# Patient Record
Sex: FEMALE | Race: WHITE | Employment: FULL TIME | URBAN - METROPOLITAN AREA
[De-identification: names, ages, dates, MRNs, and addresses within clinical notes are randomized per-mention and may not be internally consistent; named-entity substitution may affect disease eponyms.]

---

## 2018-01-16 ENCOUNTER — OFFICE VISIT (OUTPATIENT)
Dept: DERMATOLOGY | Age: 63
End: 2018-01-16

## 2018-01-16 DIAGNOSIS — L57.0 AK (ACTINIC KERATOSIS): ICD-10-CM

## 2018-01-16 DIAGNOSIS — L82.0 INFLAMED SEBORRHEIC KERATOSIS: ICD-10-CM

## 2018-01-16 DIAGNOSIS — D48.5 NEOPLASM OF UNCERTAIN BEHAVIOR OF SKIN: ICD-10-CM

## 2018-01-16 DIAGNOSIS — Z85.828 HISTORY OF NONMELANOMA SKIN CANCER: Primary | ICD-10-CM

## 2018-01-16 DIAGNOSIS — L71.9 ROSACEA: ICD-10-CM

## 2018-01-16 PROCEDURE — 11100 PR BIOPSY OF SKIN LESION: CPT | Performed by: DERMATOLOGY

## 2018-01-16 PROCEDURE — 17110 DESTRUCTION B9 LES UP TO 14: CPT | Performed by: DERMATOLOGY

## 2018-01-16 PROCEDURE — 11101 PR BIOPSY, EACH ADDED LESION: CPT | Performed by: DERMATOLOGY

## 2018-01-16 PROCEDURE — 99203 OFFICE O/P NEW LOW 30 MIN: CPT | Performed by: DERMATOLOGY

## 2018-01-16 RX ORDER — FLUOROURACIL 50 MG/G
CREAM TOPICAL
Qty: 40 G | Refills: 0 | Status: SHIPPED | OUTPATIENT
Start: 2018-01-16 | End: 2021-12-09 | Stop reason: SDUPTHER

## 2018-01-16 NOTE — PATIENT INSTRUCTIONS
thin film of white petrolatum (Vaseline©).  You do not need to cover the area, but can if you prefer.  Do NOT allow the site to become dry or crusted, or attempt to dry it out with rubbing alcohol or hydrogen peroxide.  Continue this regimen until the area is pink and healed. Depending on the size and location of your cryosurgery site, healing may take 2 to 4 weeks.  The area may continue to be pink for several weeks, and over the next few months may become darker or lighter than the surrounding skin. This may be a permanent change. Fluorouracil (Efudex / Carac)     Your physician has prescribed a topical medication that is used to treat pre-cancerous skin lesions and certain types of skin cancers. We are providing you with the following information so that you understand how the medication should be used and potential side effects you may experience.  Clean and dry the areas of the skin that will be treated.  Apply a small amount of the medication to your fingertip. Dab the medication onto the designated areas and rub it in.  Discontinue the medication once the skin starts to scab. Typically this takes between 2 and 4 weeks after starting the medication.  Avoid applying the medication in or around the eyes or eyelids. If the medication gets into your eyes, nose or mouth, rinse immediately.  Wash your hands immediately after application.  Side effects include: burning, redness, irritation, dryness, pain, swelling and changes in skin color. Vaseline and/or cool compresses may be applied to affected areas for comfort.  Please note that you may be more sensitive to the sun. Use sunscreen and wear protective clothing when outdoors. Avoid prolonged sun exposure.     · Once you have discontinued the medication, apply vaseline several times daily until the skin has

## 2018-01-23 ENCOUNTER — TELEPHONE (OUTPATIENT)
Dept: DERMATOLOGY | Age: 63
End: 2018-01-23

## 2018-07-31 ENCOUNTER — OFFICE VISIT (OUTPATIENT)
Dept: DERMATOLOGY | Age: 63
End: 2018-07-31

## 2018-07-31 DIAGNOSIS — L57.0 AK (ACTINIC KERATOSIS): Primary | ICD-10-CM

## 2018-07-31 DIAGNOSIS — L82.0 INFLAMED SEBORRHEIC KERATOSIS: ICD-10-CM

## 2018-07-31 PROCEDURE — 17003 DESTRUCT PREMALG LES 2-14: CPT | Performed by: DERMATOLOGY

## 2018-07-31 PROCEDURE — 17110 DESTRUCTION B9 LES UP TO 14: CPT | Performed by: DERMATOLOGY

## 2018-07-31 PROCEDURE — 17000 DESTRUCT PREMALG LESION: CPT | Performed by: DERMATOLOGY

## 2018-07-31 NOTE — PROGRESS NOTES
[facility]  Nino Tran MD  [phone]      Brain Rodriguez Fitzpatric  1955    63 y.o. female     Date of Visit: 7/31/2018    Last seen: 1-2018    Chief Complaint: f/u AK's  Chief Complaint   Patient presents with    Follow-up     6 month check - used efudex on chest     History of Present Illness:  *son is getting  in June 2019    Here for f/u for AK's. S/p efudex to the chest x 3+ weeks in Jan 2018 - had significant inflammation and healed well - feels much smoother and has only a few rough lesions remaining. S/p bx of lesio non the L medial breast at last visit - read as AK, cleared with efudex. She has an irritated lesion on the R temple and L arm. [facility] on the back ~ 2000. BCC on the R eyelid/periocular area s/p Mohs prior to 2000 - she believes had Mohs by Dr. Jackie Jones. 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 No family hx of skin cancer. Wears sunscreen. Review of Systems:  Gen: Feels well, good sense of health. Skin: No changing moles or lesions. No new rashes. Past Medical History, Family History, Surgical History, Medications and Allergies reviewed. Outpatient Prescriptions Marked as Taking for the 7/31/18 encounter (Office Visit) with Millie Jung MD   Medication Sig Dispense Refill    fluorouracil (EFUDEX) 5 % cream Apply topically 2 times daily to the chest for ~ 2 weeks or until significant irritation. Avoid the eyes and wash hands. 40 g 0     No Known Allergies    History reviewed. No pertinent past medical history. Past Surgical History:   Procedure Laterality Date    COLONOSCOPY  12/09/2016    Normal repeat in 2026    HYSTERECTOMY      MOHS SURGERY      bcc on rt eyelid and back       Physical Examination     Gen, well-appearing    Scars clear  L medial breast clear  Chest with 4 erythematous roughly scaled macules remaining              Assessment and Plan     1.  Numerous AK's - chest - marked improvement and 4 remaining  - 4 lesion(s) on the chest treated with liquid nitrogen x 2 cycles. Patient educated on risk of blister, hypopigmentation/scar and wound care. 2. ISK - R temple, L arm - 2  - lesion(s) on the above areas treated with liquid nitrogen x 2 cycles. Patient educated on risk of blister, hypopigmentation/scar and wound care.       Previously discussed - Rosacea - ET - moderate  - ed Vbeam - 200-250/trx x 3 trx

## 2019-01-29 ENCOUNTER — OFFICE VISIT (OUTPATIENT)
Dept: DERMATOLOGY | Age: 64
End: 2019-01-29
Payer: COMMERCIAL

## 2019-01-29 DIAGNOSIS — Z85.828 HISTORY OF NONMELANOMA SKIN CANCER: Primary | ICD-10-CM

## 2019-01-29 DIAGNOSIS — L82.1 SEBORRHEIC KERATOSIS: ICD-10-CM

## 2019-01-29 DIAGNOSIS — D22.9 MULTIPLE NEVI: ICD-10-CM

## 2019-01-29 DIAGNOSIS — L71.9 ROSACEA: ICD-10-CM

## 2019-01-29 DIAGNOSIS — L57.0 AK (ACTINIC KERATOSIS): ICD-10-CM

## 2019-01-29 PROCEDURE — 99213 OFFICE O/P EST LOW 20 MIN: CPT | Performed by: DERMATOLOGY

## 2019-01-29 PROCEDURE — 17003 DESTRUCT PREMALG LES 2-14: CPT | Performed by: DERMATOLOGY

## 2019-01-29 PROCEDURE — MISCDES14 COSMETIC DESTRUCTION (CRYO/ED) OF BENIGN LESION - UP TO 14: Performed by: DERMATOLOGY

## 2019-01-29 PROCEDURE — 17000 DESTRUCT PREMALG LESION: CPT | Performed by: DERMATOLOGY

## 2019-10-24 ENCOUNTER — OFFICE VISIT (OUTPATIENT)
Dept: DERMATOLOGY | Age: 64
End: 2019-10-24
Payer: COMMERCIAL

## 2019-10-24 DIAGNOSIS — L57.0 AK (ACTINIC KERATOSIS): ICD-10-CM

## 2019-10-24 DIAGNOSIS — D48.5 NEOPLASM OF UNCERTAIN BEHAVIOR OF SKIN: ICD-10-CM

## 2019-10-24 DIAGNOSIS — L82.1 SEBORRHEIC KERATOSIS: ICD-10-CM

## 2019-10-24 DIAGNOSIS — Z85.828 HISTORY OF NONMELANOMA SKIN CANCER: Primary | ICD-10-CM

## 2019-10-24 DIAGNOSIS — L71.9 ROSACEA: ICD-10-CM

## 2019-10-24 DIAGNOSIS — D22.9 MULTIPLE NEVI: ICD-10-CM

## 2019-10-24 PROCEDURE — 99213 OFFICE O/P EST LOW 20 MIN: CPT | Performed by: DERMATOLOGY

## 2019-10-24 PROCEDURE — MISCTAG14 COSMETIC REMOVAL OF BENIGN TAGS UP TO 14: Performed by: DERMATOLOGY

## 2019-10-24 PROCEDURE — 17003 DESTRUCT PREMALG LES 2-14: CPT | Performed by: DERMATOLOGY

## 2019-10-24 PROCEDURE — 11102 TANGNTL BX SKIN SINGLE LES: CPT | Performed by: DERMATOLOGY

## 2019-10-24 PROCEDURE — 17000 DESTRUCT PREMALG LESION: CPT | Performed by: DERMATOLOGY

## 2019-10-28 LAB — DERMATOLOGY PATHOLOGY REPORT: NORMAL

## 2020-04-13 ENCOUNTER — TELEPHONE (OUTPATIENT)
Dept: INTERNAL MEDICINE CLINIC | Age: 65
End: 2020-04-13

## 2020-04-13 RX ORDER — METHYLPREDNISOLONE 4 MG/1
TABLET ORAL
Qty: 21 TABLET | Refills: 0 | Status: SHIPPED | OUTPATIENT
Start: 2020-04-13 | End: 2020-04-19

## 2021-05-27 ENCOUNTER — OFFICE VISIT (OUTPATIENT)
Dept: DERMATOLOGY | Age: 66
End: 2021-05-27
Payer: COMMERCIAL

## 2021-05-27 VITALS — TEMPERATURE: 97.8 F

## 2021-05-27 DIAGNOSIS — L71.9 ROSACEA: ICD-10-CM

## 2021-05-27 DIAGNOSIS — D22.9 MULTIPLE NEVI: ICD-10-CM

## 2021-05-27 DIAGNOSIS — D48.5 NEOPLASM OF UNCERTAIN BEHAVIOR OF SKIN: ICD-10-CM

## 2021-05-27 DIAGNOSIS — L82.1 SEBORRHEIC KERATOSIS: ICD-10-CM

## 2021-05-27 DIAGNOSIS — Z85.828 HISTORY OF NONMELANOMA SKIN CANCER: Primary | ICD-10-CM

## 2021-05-27 DIAGNOSIS — L57.0 AK (ACTINIC KERATOSIS): ICD-10-CM

## 2021-05-27 DIAGNOSIS — L82.0 INFLAMED SEBORRHEIC KERATOSIS: ICD-10-CM

## 2021-05-27 PROCEDURE — 99213 OFFICE O/P EST LOW 20 MIN: CPT | Performed by: DERMATOLOGY

## 2021-05-27 PROCEDURE — 17110 DESTRUCTION B9 LES UP TO 14: CPT | Performed by: DERMATOLOGY

## 2021-05-27 PROCEDURE — 11102 TANGNTL BX SKIN SINGLE LES: CPT | Performed by: DERMATOLOGY

## 2021-05-27 PROCEDURE — 17000 DESTRUCT PREMALG LESION: CPT | Performed by: DERMATOLOGY

## 2021-05-27 PROCEDURE — 17003 DESTRUCT PREMALG LES 2-14: CPT | Performed by: DERMATOLOGY

## 2021-05-27 NOTE — PATIENT INSTRUCTIONS
Cryosurgery (Freezing) Wound Care Instructions    AFTER THE PROCEDURE:    You will notice swelling and redness around the site. This is normal.    You may experience a sharp or sore feeling for the next several days. For this discomfort, you may take acetaminophen (Tylenol©).  A blister may develop at the treated area, sometimes as soon as by the end of the day. After several days, the blister will subside and a scab will form.  If the area is bumped or traumatized during the first few days following freezing, you may develop bleeding into the blister, forming a blood blister. This is nothing to be alarmed about.  If the blister is tense, uncomfortable, or much larger than the site that was frozen, you may pop the blister along its edge with a sterile needle (boiled, heated under a flame, or cleaned with alcohol) to allow the fluid to drain out. If the blister does not bother you, no treatment is needed.  Do NOT peel off the top of the blister roof. It will act as a dressing on top of your wound. WOUND CARE:    You may shower or bathe as usual, but avoid scrubbing the areas that have been frozen.  Cleanse the site twice a day with mild soapy water, and then apply a thin film of white petrolatum (Vaseline©).  You do not need to cover the area, but can if you prefer.  Do NOT allow the site to become dry or crusted, or attempt to dry it out with rubbing alcohol or hydrogen peroxide.  Continue this regimen until the area is pink and healed. Depending on the size and location of your cryosurgery site, healing may take 2 to 4 weeks.  The area may continue to be pink for several weeks, and over the next few months may become darker or lighter than the surrounding skin. This may be a permanent change. Biopsy Wound Care Instructions    · Keep the bandage in place for 24 hours. · Cleanse the wound with mild soapy water daily   Gently dry the area.    Apply Vaseline or petroleum jelly

## 2021-06-01 LAB — DERMATOLOGY PATHOLOGY REPORT: ABNORMAL

## 2021-06-03 DIAGNOSIS — C44.319 BASAL CELL CARCINOMA (BCC) OF SKIN OF OTHER PART OF FACE: Primary | ICD-10-CM

## 2021-08-26 ENCOUNTER — PROCEDURE VISIT (OUTPATIENT)
Dept: SURGERY | Age: 66
End: 2021-08-26
Payer: COMMERCIAL

## 2021-08-26 VITALS — TEMPERATURE: 97.4 F | SYSTOLIC BLOOD PRESSURE: 141 MMHG | DIASTOLIC BLOOD PRESSURE: 85 MMHG

## 2021-08-26 DIAGNOSIS — C44.41 BASAL CELL CARCINOMA (BCC) OF SCALP: Primary | ICD-10-CM

## 2021-08-26 PROCEDURE — 17311 MOHS 1 STAGE H/N/HF/G: CPT | Performed by: DERMATOLOGY

## 2021-08-26 PROCEDURE — 14040 TIS TRNFR F/C/C/M/N/A/G/H/F: CPT | Performed by: DERMATOLOGY

## 2021-08-26 PROCEDURE — 17312 MOHS ADDL STAGE: CPT | Performed by: DERMATOLOGY

## 2021-08-26 PROCEDURE — 99214 OFFICE O/P EST MOD 30 MIN: CPT | Performed by: DERMATOLOGY

## 2021-08-26 RX ORDER — CEPHALEXIN 500 MG/1
500 CAPSULE ORAL 3 TIMES DAILY
Qty: 15 CAPSULE | Refills: 0 | Status: SHIPPED | OUTPATIENT
Start: 2021-08-26 | End: 2022-06-30

## 2021-08-26 NOTE — PROGRESS NOTES
MOHS PROCEDURE NOTE    PHYSICIAN:  Jean Downs. Jose A Wade MD    ASSISTANT: Lucio Patel RN     REFERRING PROVIDER:  Ruthann Shine MD    PREOPERATIVE DIAGNOSIS:Superficial and Nodular Basal Cell Carcinoma     SPECIFIC MOHS INDICATIONS:  location    AUC SCORIN/9    POSTOPERATIVE DIAGNOSIS: SAME    LOCATION: Left frontal hairline    OPERATIVE PROCEDURE:  MOHS MICROGRAPHIC SURGERY    RECONSTRUCTION OF DEFECT: Stellate advancement flap    PREOPERATIVE SIZE: 10x6 MM    DEFECT SIZE: 19x17 MM    LENGTH OF REPAIRED WOUND/SIZE OF FLAP/SIZE OF GRAFT:  35x15 MM + size of defect = 8.48cm2    ANESTHESIA:  7mL 1% lidocaine with epinephrine 1:100,000 buffered. EBL:  MINIMAL    DURATION OF PROCEDURE:  3 HOURS    POSTOPERATIVE OBSERVATION: 1 HOUR    SPECIMENS:  SEE MOHS MAP    COMPLICATIONS:  NONE    DESCRIPTION OF PROCEDURE:  The patient was given a mirror, as appropriate, and the biopsy site was identified, marked with a surgical marking pen, and verified by the patient. Options for treatment were discussed and the patient was informed that Mohs surgery was the selected treatment based on its lower recurrence rate, given the features listed above, as compared to other treatment modalities such as excision, radiation, or curettage, and agreed with this treatment plan. Risks and benefits including bruising, swelling, bleeding, infection, nerve injury, recurrence, and scarring were discussed with the patient prior to the procedure and a written consent detailing these and other risks was reviewed with the patient and signed. There was a time out for person and procedure verification. The surgical site was prepped with an antiseptic solution. Application of an antiseptic solution was repeated before each surgical stage. Stage I:  The clinically-apparent tumor was carefully defined and debulked, determining the edge of the surgical excision.     A thin layer of tumor-laden tissue was excised with a narrow margin of normal-appearing skin, using the technique of Mohs. A map was prepared to correspond to the area of skin from which it was excised. Hemostasis was achieved using electrosurgery. The wound was bandaged. The tissue was prepared for the cryostat and sectioned. 1 section(s) prepared. Each section was coded, cut, and stained for microscopic examination. The entire base and margins of the excised piece of tissue were examined by the surgeon. The tissue was examined to the level of subcutaneous fat. Stage I:  Superficial BCC: isolated basaloid lobules projecting from the lower margin of the epidermis and Nodular BCC: large basaloid lobules of varying shape and size with peripheral palisading present around the rim of the lobule, with retraction of the tumor lobules from their associated stroma. Stage II:  Superficial BCC: isolated basaloid lobules projecting from the lower margin of the epidermis. The remaining tumor was noted and the next stage was performed. Stage II, III:  A thin layer of tissue was removed at the histologically-identified sites of remaining tumor. The entire procedure as described in stage I was repeated to process the tissue according to Mohs technique. 1 section(s) prepared for stage II and III. No tumor was identified at the peripheral margins of stage III of microscopically controlled surgery. DEFECT MANAGEMENT:    REPAIR DESCRIPTION:  Various closure modalities were discussed with the patient, and it was decided that a stellate advancement flap would best preserve normal anatomic and functional relationships. Additional risks of flap necrosis, irregular scar line and wound dehiscence were discussed. The patient was placed on the procedure room table. The area was anesthetized with 1% lidocaine with epinephrine 1:100,000 buffered, was given a sterile prep using Chlorhexidine gluconate 4% solution  and draped in the usual sterile fashion.  Incisions were made in the appropriate fashion for the flap designed. The wound was extensively undermined and meticulous hemostasis were obtained using spot monopolar electrocoagulation. The flap was elevated and advanced into the primary defect. Subcutaneous dead space and dermis were closed using  4-0  Vicryl and the epidermis was approximated using 4-0/5-0 Prolene interrupted epidermal sutures. WOUND COVERAGE:  The wound was cleaned with normal saline solution, dried off, Aquaphor ointment was applied, and the wound was covered. A dressing was applied for stabilization and light pressure. The patient was given detailed oral and written instructions on postoperative care. There were no complications. The patient left the Unit in good medical condition. FOLLOW-UP:  The patient will return for suture removal in 12 days.

## 2021-08-26 NOTE — PATIENT INSTRUCTIONS
[facility] Office Hours:    Monday-Thursday  7:30 AM-4:30 PM    Friday  9:00 AM-1:00 PM        POST-OPERATIVE CARE FOR STICHES  Bandage change after 48 hours    CARING FOR YOUR SURGICAL SITE  The bandage should remain on and completely dry for 48 hours. Do NOT get the bandage wet. 1. After the first 48 hours, gently remove the remaining part of the bandage. It can be helpful to moisten the bandage edges in the shower. Steri strips may still be on the wound. It is ok, they will fall off slowly with the daily bandage changes. 2. Gently clean the wound daily with mild soap and water. Try to clean off crust and debris. 3. Dry (pat) the area with a clean Q-tip or gauze. 4. Apply a layer of Vaseline/ Aquaphor (or Bacitracin if your doctor recommends) to the wound area only. 5. Cut a piece of Telfa (or any non-stick dressing) to fit just over the wound and secure it with paper tape. If the wound is small you may use a Band- Aid. Keep area covered for a total of 1 week(s). If the dressing comes off or if you have questions, or concerns about the dressing, please call the office for instructions! POST OPERATIVE INSTRUCTIONS    1. Activity: Do not lift anything heavier than a gallon of milk for 1 week. Also, avoid strenuous activity such as running, power walking or contact sports. 2. Eating and drinking: Do not drink alcohol for 48 hours after your procedure. Alcohol increases the chances of bleeding. 3. Medicines   -If you have discomfort, take Acetaminophen (Tylenol or Extra Strength Tylenol). Follow the instructions and warning on the bottle. -If your doctor has prescribed you an Aspirin daily, please keep taking it. Do not take extra Aspirin or medicines containing Aspirin (such as Moriah-Warren and Excedrin) for 48 hours after your procedure. Bleeding: If bleeding occurs, DO NOT remove the bandage. Put firm pressure on the area with gauze for 20 minutes without peeking.  If the bleeding continues, apply pressure for another 20 minutes. If the bleeding does not stop after you apply pressure, call us right away. If you can not call, go to the nearest emergency room or urgent care facility. What to expect:  You may have these symptoms. They are normal and should get better with time:  1. Swelling. Swelling usually increases for the first 48 hours after your procedure and then begins to improve. Some soreness and redness around your wound. If we worked close to your eyes (forehead, nose, temple, or upper cheeks) your eyes may become swollen and/ or black and blue. 2. Bruising, which could last 1 week or more. 3. Pink and bumpy appearance to the scar. This may happen a few weeks after your procedure. After 4 weeks, you may gently massage the area each day with facial moisturizer or petroleum jelly (Vaseline or Aquaphor). This will help to smooth the skin and improve the appearance of the scar. The color of your scar will fade over time, but it may be pink for several months after the procedure. The scar may take 6 months to 1 year to reach its final color and appearance. 4. \"Spitting\" suture. Occasionally, an inside suture (stitch) does not completely dissolve. When this happens, (generally 4-8 weeks after surgery), it causes a bump or \"pimple\" to form on the scar. This is easily removed and is not at all serious. It does not mean the skin cancer has returned. Contact us if it happens, but do not be alarmed. Vitamin E oil is NOT necessary. A good moisturizer is just as effective. Sunscreen IS necessary. Use at least and SPF 30 sunscreen daily- even in winter    Call us at [phone] right away if you have any of the following symptoms:  -Bleeding that you can not stop (see highlighted area above). -Pain that lasts longer than 48 hours.  -Your wound becomes more painful, red or hot.   -Bruising and swelling that does not begin to improve within the 48 hours or gets worse suddenly.

## 2021-08-26 NOTE — PROGRESS NOTES
1. NUMBER AND COMPLEXITY OF PROBLEMS ADDRESSED  Acute illness or injury: yes - Superficial and nodular BCC left frontal hairline   Chronic illness:  no  Treatment: Decision for Mohs surgery with local flap or graft reconstruction       Discussed alternative treatments with patient including risks and benefits, which include radiation treatment, excision, curettage, oral therapeutic agents such as erevidge. Indications for Mohs surgery were reviewed and include location. The Mohs AUC score is calculated to be: 8 which indicates that Mohs surgery is appropriate and indicated for this patients lesion. Based on this and the benefits of Mohs surgery over the above options in this patient's case, I recommend Mohs surgery as the optimal treatment. 2. DATA ANALYSIS:   Tests ordered and reviewed:  Pathology report reviewed: yes - 5/27/21, superficial and nodular BCC left frontal hairline  Slides ordered for consult: no  External notes reviewed: yes - no previous tx  Photo reviewed: yes - clinician photo for site verification  Imaging/Labs ordered or reviewed (I.e. INR, CBC, CT, MRI, PET): no  Independent historian:  no     Independent interpretation of tests (personally looked at pathologic slides):   no    Discussion of management with another health care provider: no     3. RISKS OF COMPLICATIONS AND/OR MORBIDITY OR MORTALITY OF PT MANAGEMENT  Decision regarding elective major surgery: The details of Mohs surgery and repair options were discussed with the patient and all questions were answered. The patient opted to proceed with scheduling Mohs surgery as soon as possible. The patient understands to call with any changes to his/her medical condition prior to surgery, including any rapid growth of the lesion discussed today or any change in medications. Repair options discussed in detail and illustrated for the patient either with a drawing or representative photos of similar reconstructions in other cases.   I informed the patient that based on size and location the following would be the most likely options, although this could change depending on the final size and shape of the defect following cancer extirpation:    stellate advancement flap     Risks of surgery were discussed, including but not limited to bleeding, infection, sensory nerve damage, motor nerve damage, numbness, tingling, pain, need for further procedures including scar revision surgery or other scar improvement procedures, referral to other specialties for additional procedures, cosmetic outcome that does not align with patient expectation. All questions answered in regards to the need for surgery and the possible reconstructive modalities necessary.      High risk tumor characteristics that complicate surgery or post-operative healing (patient or procedure risk factors including high risk squamous cell carcinoma and the necessity of flap or graft reconstruction):  yes - decision for surgery for flap closure to prevent excess tension on the wound and brow elevation  Co-morbidities increasing the risk of surgical complications, mortality or morbidity: (I.e. immunosuppression, diabetes, anticoagulant therapy, pacemaker/defibrillator, prosthetic joint, tobacco use):  no    OTC Medication recommendations:  Acetaminophen and or ibuprofen for post-procedure pain if cleared by PCP and permissible with patients known co-morbidities  Patient currently on Anticoagulant or other medications that may impact surgery:  no  Plan for pre or post-operative Prescription medications: yes - keflex Full range of motion of upper and lower extremities, no joint tenderness/swelling.

## 2021-08-27 ENCOUNTER — TELEPHONE (OUTPATIENT)
Dept: SURGERY | Age: 66
End: 2021-08-27

## 2021-09-09 ENCOUNTER — OFFICE VISIT (OUTPATIENT)
Dept: SURGERY | Age: 66
End: 2021-09-09

## 2021-09-09 DIAGNOSIS — Z48.02 VISIT FOR SUTURE REMOVAL: Primary | ICD-10-CM

## 2021-09-09 PROCEDURE — 99024 POSTOP FOLLOW-UP VISIT: CPT | Performed by: DERMATOLOGY

## 2021-09-09 NOTE — PROGRESS NOTES
S:  The patient is here for suture removal s/p Mohs surgery on the left forehead at hairline and stellate advancement flap repair, 1 week(s) ago. The site appears well-healed without signs of infection (redness, pain or discharge). The sutures were removed. There is slight superficial sloughing at center of flap where tension was at a maximum. Daily Wound care and activity instructions given. The patient was scheduled for follow-up in about 3 weeks for scar/wound check. The patient was scheduled for f/u with General Dermatology per their instructions.

## 2021-10-07 ENCOUNTER — TELEPHONE (OUTPATIENT)
Dept: SURGERY | Age: 66
End: 2021-10-07

## 2021-10-07 NOTE — TELEPHONE ENCOUNTER
Pt cancelling her appt. The site looks great and she feels it has healed very nicely and is ok cancelling this appt; she could not reschedule later today due to leaving town.

## 2021-12-09 ENCOUNTER — OFFICE VISIT (OUTPATIENT)
Dept: DERMATOLOGY | Age: 66
End: 2021-12-09
Payer: COMMERCIAL

## 2021-12-09 DIAGNOSIS — L82.0 INFLAMED SEBORRHEIC KERATOSIS: ICD-10-CM

## 2021-12-09 DIAGNOSIS — Z85.828 HISTORY OF NONMELANOMA SKIN CANCER: Primary | ICD-10-CM

## 2021-12-09 DIAGNOSIS — L82.1 SEBORRHEIC KERATOSIS: ICD-10-CM

## 2021-12-09 DIAGNOSIS — L57.0 AK (ACTINIC KERATOSIS): ICD-10-CM

## 2021-12-09 DIAGNOSIS — D22.9 MULTIPLE NEVI: ICD-10-CM

## 2021-12-09 DIAGNOSIS — L71.9 ROSACEA: ICD-10-CM

## 2021-12-09 PROCEDURE — 99214 OFFICE O/P EST MOD 30 MIN: CPT | Performed by: DERMATOLOGY

## 2021-12-09 PROCEDURE — 17110 DESTRUCTION B9 LES UP TO 14: CPT | Performed by: DERMATOLOGY

## 2021-12-09 RX ORDER — FLUOROURACIL 50 MG/G
CREAM TOPICAL
Qty: 40 G | Refills: 1 | Status: SHIPPED | OUTPATIENT
Start: 2021-12-09

## 2021-12-09 NOTE — PROGRESS NOTES
[facility]  Rosetta Rinne, MD  [phone]      Yobani Book  1955    61 y.o. female     Date of Visit: 12/9/2021    Last seen: 5-2021    Chief Complaint: moles/lesions, AK's  Chief Complaint   Patient presents with    Skin Lesion     History of Present Illness:  *son got  in June 2019  *works as a hairdresser  *was a [employer]-gal in the past    1. Here for f/u for skin cancer. No probs with previous sites or new concerns. BCC - L frontal hairline - Mohs - 8-2021  BCC on the back ~ 2000. BCC on the R eyelid/periocular area s/p Mohs prior to 2000 - she believes had Mohs by Dr. Elsy Macdonald  She has scattered asymptomatic brown macules and papules on the trunk and extremities. 2. Here for f/u for AK's. S/p efudex to the chest x 3+ weeks in Jan 2018 - had significant inflammation and healed well - felt much smoother and had only a few tiny lesions remaining - worsening somewhat over the past year. 3. Previously discussed - redness and blood vessels on the cheeks. No trx tried. Denies papules/pustules. 4. She has a few irritated papules on the chest,    No family hx of skin cancer. Wears sunscreen. Review of Systems:  Gen: Feels well, good sense of health. Skin: No changing moles or lesions. No new rashes. Past Medical History, Family History, Surgical History, Medications and Allergies reviewed. Outpatient Medications Marked as Taking for the 12/9/21 encounter (Office Visit) with Sussy Luis MD   Medication Sig Dispense Refill    Multiple Vitamin (MULTI-VITAMIN DAILY PO) Take by mouth      Connie, Zingiber officinalis, (CONNIE ROOT PO) Take by mouth      MILK THISTLE PO Take by mouth      Omega-3 Fatty Acids (FISH OIL PO) Take by mouth      Multiple Vitamins-Minerals (EYE VITAMINS PO) Take by mouth      Glucosamine HCl (GLUCOSAMINE PO) Take by mouth       No Known Allergies    History reviewed. No pertinent past medical history.   Past Surgical History:   Procedure Laterality Date    COLONOSCOPY  12/09/2016    Normal repeat in 2026    HYSTERECTOMY      MOHS SURGERY      bcc on rt eyelid and back       Physical Examination     Gen, well-appearing    Full skin exam performed except for underwear covered areas. Scars clear  trunk and extremities with scattered brown macules and papules   Chest with scattered erythematous roughly scaled macules  Cheeks, chin with moderate erythema and telangiectasias  Chest with few peripherally erythematous tan dull stuck on papules                Assessment and Plan     1. Hx of NMSC, no signs recurrence   1b. benign-appearing nevi and SKs  - educ re ABCD's of MM   educ sun protection SPF 30+  encouraged skin check yearly (sooner if indicated), self checks    2. AK's/chronic photodamage - chest - worse recently  - efudex cream bid to affected areas x 2-4 weeks/until significant irritation  ed erythema, irritation, erosions, crusting   ed wnd care prn and OK to d/c x few days if irritation is too severe     3. Rosacea - ET - moderate  - previously ed Vbeam - 200-250/trx x 3 trx    4. ISK's - chest - 3  - lesion(s) on the above areas treated with liquid nitrogen x 2 cycles. Patient educated on risk of blister, hypopigmentation/scar and wound care. (85 -150 previously for other elective removal of SK's)     F/u 1 year for FSE.

## 2022-06-30 ENCOUNTER — OFFICE VISIT (OUTPATIENT)
Dept: DERMATOLOGY | Age: 67
End: 2022-06-30
Payer: COMMERCIAL

## 2022-06-30 DIAGNOSIS — D48.5 NEOPLASM OF UNCERTAIN BEHAVIOR OF SKIN: ICD-10-CM

## 2022-06-30 DIAGNOSIS — Z85.828 HISTORY OF NONMELANOMA SKIN CANCER: Primary | ICD-10-CM

## 2022-06-30 DIAGNOSIS — D22.9 MULTIPLE NEVI: ICD-10-CM

## 2022-06-30 DIAGNOSIS — L82.1 SEBORRHEIC KERATOSIS: ICD-10-CM

## 2022-06-30 DIAGNOSIS — L57.0 AK (ACTINIC KERATOSIS): ICD-10-CM

## 2022-06-30 DIAGNOSIS — L71.9 ROSACEA: ICD-10-CM

## 2022-06-30 PROCEDURE — 11102 TANGNTL BX SKIN SINGLE LES: CPT | Performed by: DERMATOLOGY

## 2022-06-30 PROCEDURE — 99213 OFFICE O/P EST LOW 20 MIN: CPT | Performed by: DERMATOLOGY

## 2022-06-30 NOTE — PATIENT INSTRUCTIONS
Biopsy Wound Care Instructions    · Keep the bandage in place for 24 hours. · Cleanse the wound with mild soapy water daily   Gently dry the area.  Apply Vaseline or petroleum jelly to the wound using a cotton tipped applicator.  Cover with a clean bandage.  Repeat this process until the biopsy site is healed.  If you had stitches placed, continue treating the site until the stitches are removed. Remember to make an appointment to return to have your stitches removed by our staff.  You may shower and bathe as usual.       ** Biopsy results generally take around 7 business days to come back. If you have not heard from us by then, please call the office at [phone]. *Please note that biopsy results are released to both the patient and physician at the same time in [address] Please allow time for your physician to review the results. One of our staff members will reach out to you with the results and plan. Protecting Yourself From the Sun    · Apply an over-the-counter broad spectrum water resistant sunscreen with an SPF of at least 30 to exposed areas of the skin. Dont forget the ears and lips! Remember to reapply sunscreen about every 2 hours and after swimming or sweating. · Wear sun protective clothing. Swim shirts (aka. rash guards) are a great idea and negates the need to reapply sunscreen in those areas.      · Seek the shade whenever possible especially between the hours of 10 am and 4 pm when the suns rays are the strongest.     · Avoid tanning beds  ·

## 2022-06-30 NOTE — PROGRESS NOTES
[id number] [facility]  Serena Forbes MD  [phone]      [place]  1955    61 y.o. female     Date of Visit: 6/30/2022    Last seen:     Chief Complaint: moles/lesions, AK's  Chief Complaint   Patient presents with    Skin Exam     6 mo FSE        HX:NMSC     History of Present Illness:  *son got  in June 2019  *works as a hairdresser  *was a [employer]-gal in the past    1. Here for f/u for skin cancer. No probs with previous sites or new concerns. BCC - L frontal hairline - Mohs - 8-2021  BCC on the back ~ 2000. BCC on the R eyelid/periocular area s/p Mohs prior to 2000 - she believes had Mohs by Dr. Paulson   She has scattered asymptomatic brown macules and papules on the trunk and extremities. 2. Here for f/u for AK's. *5-FU - used this past winter on the chest.  No concerns currently. S/p efudex to the chest x 3+ weeks in Jan 2018 - had significant inflammation and healed well - felt much smoother and had only a few tiny lesions remaining. 3. Previously discussed - redness and blood vessels on the cheeks. No trx tried. Denies papules/pustules. 4. She has a concerning pigmented lesion on the lower back. Asx. No family hx of skin cancer. Wears sunscreen. Review of Systems:  Gen: Feels well, good sense of health. Skin: No changing moles or lesions. No new rashes. Past Medical History, Family History, Surgical History, Medications and Allergies reviewed.     Outpatient Medications Marked as Taking for the 6/30/22 encounter (Office Visit) with Doug Nguyen MD   Medication Sig Dispense Refill    Multiple Vitamin (MULTI-VITAMIN DAILY PO) Take by mouth      Connie, Zingiber officinalis, (CONNIE ROOT PO) Take by mouth      MILK THISTLE PO Take by mouth      Omega-3 Fatty Acids (FISH OIL PO) Take by mouth      Multiple Vitamins-Minerals (EYE VITAMINS PO) Take by mouth      Glucosamine HCl (GLUCOSAMINE PO) Take by mouth      fluorouracil (EFUDEX) 5 % cream Apply topically 2 times daily to the chest for ~ 2 weeks or until significant irritation. Avoid the eyes and wash hands. 40 g 1     No Known Allergies    History reviewed. No pertinent past medical history. Past Surgical History:   Procedure Laterality Date    COLONOSCOPY  12/09/2016    Normal repeat in 2026    HYSTERECTOMY (CERVIX STATUS UNKNOWN)      MOHS SURGERY      bcc on rt eyelid and back       Physical Examination     Gen, well-appearing    Full skin exam performed except for underwear covered areas. Scars clear  trunk and extremities with scattered brown macules and papules   Chest with few tiny erythematous roughly scaled macules  Cheeks, chin with moderate erythema and telangiectasias  Lower central back with irregular brown macule                Assessment and Plan     1. Hx of NMSC, no signs recurrence   1b. benign-appearing nevi and SKs  - Monitor for ABCD's of MM and si/sx of NMSC  Continue sun protection - OTC sunscreen with SPF 30-50+ recommended and reviewed usage  Encouraged skin check yearly (sooner if indicated), self checks    2. AK's/chronic photodamage - chest - stable/better  - cont sun protection and consider rpt field trx in the winter if new lesions/worsening    3. Rosacea - ET - moderate  - previously ed Vbeam - 200-250/trx x 3 trx    4. R/o dysplastic nevus - lower central back  - Shave biopsy performed after verbal consent obtained. Patient educated regarding risk of bleeding, infection, scar and educated on wound care. Skin cleansed with alcohol pad and site anesthetized with lido + epi. Aluminum chloride applied to site for hemostasis. Petrolatum ointment and bandage applied. Specimen bottle labeled with patient information and site and specimen sent to dermpath. 80 -150 previously for other elective removal of SK's     F/u 1 year for FSE.

## 2022-07-06 LAB — DERMATOLOGY PATHOLOGY REPORT: NORMAL
